# Patient Record
Sex: FEMALE | Race: WHITE
[De-identification: names, ages, dates, MRNs, and addresses within clinical notes are randomized per-mention and may not be internally consistent; named-entity substitution may affect disease eponyms.]

---

## 2017-10-07 ENCOUNTER — HOSPITAL ENCOUNTER (EMERGENCY)
Dept: HOSPITAL 53 - M ED | Age: 32
Discharge: HOME | End: 2017-10-07
Payer: SELF-PAY

## 2017-10-07 VITALS — SYSTOLIC BLOOD PRESSURE: 156 MMHG | DIASTOLIC BLOOD PRESSURE: 96 MMHG

## 2017-10-07 VITALS — HEIGHT: 67 IN | WEIGHT: 280.43 LBS | BODY MASS INDEX: 44.01 KG/M2

## 2017-10-07 DIAGNOSIS — R41.82: ICD-10-CM

## 2017-10-07 DIAGNOSIS — F14.10: Primary | ICD-10-CM

## 2017-10-07 DIAGNOSIS — I10: ICD-10-CM

## 2017-10-07 DIAGNOSIS — F17.210: ICD-10-CM

## 2017-10-07 LAB
ADD MANUAL DIFFER: NO
ALBUMIN SERPL BCG-MCNC: 3.7 GM/DL (ref 3.2–5.2)
ALBUMIN/GLOB SERPL: 1.12 {RATIO} (ref 1–1.93)
ALP SERPL-CCNC: 90 U/L (ref 45–117)
ALT SERPL W P-5'-P-CCNC: 28 U/L (ref 12–78)
ANION GAP SERPL CALC-SCNC: 10 MEQ/L (ref 8–16)
AST SERPL-CCNC: 16 U/L (ref 15–37)
BASOPHILS # BLD AUTO: 0.1 10^3/UL (ref 0–0.2)
BASOPHILS NFR BLD AUTO: 0.7 % (ref 0–1)
BILIRUB CONJ SERPL-MCNC: 0.2 MG/DL (ref 0–0.2)
BILIRUB SERPL-MCNC: 0.8 MG/DL (ref 0.2–1)
BUN SERPL-MCNC: 11 MG/DL (ref 7–18)
CALCIUM SERPL-MCNC: 8.1 MG/DL (ref 8.5–10.1)
CHLORIDE SERPL-SCNC: 105 MEQ/L (ref 98–107)
CO2 SERPL-SCNC: 24 MEQ/L (ref 21–32)
CREAT SERPL-MCNC: 0.92 MG/DL (ref 0.55–1.02)
DIFF SLIDE NUMBER: 117
EOSINOPHIL # BLD AUTO: 0.4 10^3/UL (ref 0–0.5)
EOSINOPHIL NFR BLD AUTO: 5 % (ref 0–3)
ERYTHROCYTE [DISTWIDTH] IN BLOOD BY AUTOMATED COUNT: 12.6 % (ref 11.5–14.5)
GFR SERPL CREATININE-BSD FRML MDRD: > 60 ML/MIN/{1.73_M2} (ref 60–?)
GLUCOSE SERPL-MCNC: 99 MG/DL (ref 70–105)
IMM GRANULOCYTES NFR BLD: 0.4 % (ref 0–0)
INR PPP: 1.05
LYMPHOCYTES # BLD AUTO: 1.6 10^3/UL (ref 1.5–4.5)
LYMPHOCYTES NFR BLD AUTO: 18.3 % (ref 24–44)
MCH RBC QN AUTO: 31.3 PG (ref 27–33)
MCHC RBC AUTO-ENTMCNC: 33.3 G/DL (ref 32–36.5)
MCV RBC AUTO: 94 FL (ref 80–96)
METHADONE UR QL SCN: NEGATIVE
MONOCYTES # BLD AUTO: 0.6 10^3/UL (ref 0–0.8)
MONOCYTES NFR BLD AUTO: 7.3 % (ref 0–5)
NEUTROPHILS # BLD AUTO: 5.8 10^3/UL (ref 1.8–7.7)
NEUTROPHILS NFR BLD AUTO: 68.3 % (ref 36–66)
NRBC BLD AUTO-RTO: 0 % (ref 0–0)
PLATELET # BLD AUTO: 316 10^3/UL (ref 150–450)
POTASSIUM SERPL-SCNC: 3.6 MEQ/L (ref 3.5–5.1)
PROT SERPL-MCNC: 7 GM/DL (ref 6.4–8.2)
SODIUM SERPL-SCNC: 139 MEQ/L (ref 136–145)
WBC # BLD AUTO: 8.5 10^3/UL (ref 4–10)

## 2017-10-07 NOTE — REP
REASON:  CVA.

 

COMPARISON:  06/19/2016

 

FINDINGS:  The technique utilized in obtaining the radiograph has magnified the

cardiac silhouette and accentuated the interstitial markings.

 

The superior mediastinal structures are midline.  The cardiac silhouette is

unremarkable in size, shape, and position. The diaphragmatic surfaces of the

lungs are regular, and the costophrenic angles are clear.  The pulmonary fields

are clear.  The imaged osseous structures are intact.

 

IMPRESSION:

 

There is no acute cardiopulmonary disease.

 

 

Signed by

Yan Arboleda DO 10/07/2017 10:22 A

## 2017-10-07 NOTE — REP
REASON:  CVA.

 

COMPARISON:  01/01/2016

 

TECHNIQUE:  4.5 mm contiguous transaxial sections were obtained from the skull

base to the cerebral convexities with thin cuts through the posterior fossa

without the administration of intravenous contrast.

 

FINDINGS:  The ventricles and sulci are consistent with the patient's age.  There

are no extra-axial fluid collections.  There is no mass effect.  The deep

cerebral white matter is consistent with the patient's age.

 

The orbital and petrous structures , cerebellopontine angles, and posterior fossa

are unremarkable.

 

The sella turcica, cavernous, and paracavernous structures are essentially

unremarkable.

 

The visualized portions of the paranasal sinuses and mastoid air cells are

clear.

 

Images of the skull base show no gross abnormality.

 

IMPRESSION:

 

Essentially unremarkable CT examination of the brain.

 

There has been no significant change compared to the prior exam.

 

 

Signed by

Yan Arboleda DO 10/07/2017 10:22 A

## 2017-10-08 ENCOUNTER — HOSPITAL ENCOUNTER (EMERGENCY)
Dept: HOSPITAL 53 - M ED | Age: 32
Discharge: LEFT BEFORE DISCHARGE | End: 2017-10-08
Payer: SELF-PAY

## 2017-10-08 VITALS — HEIGHT: 67 IN | BODY MASS INDEX: 44.04 KG/M2 | WEIGHT: 280.58 LBS

## 2017-10-08 VITALS — DIASTOLIC BLOOD PRESSURE: 102 MMHG | SYSTOLIC BLOOD PRESSURE: 173 MMHG

## 2017-10-08 DIAGNOSIS — Z53.21: ICD-10-CM

## 2017-10-08 DIAGNOSIS — M79.603: Primary | ICD-10-CM

## 2017-10-08 NOTE — ECGEPIP
Stationary ECG Study

                           [facility] - ED

                                       

                                       Test Date:    2017-10-07

Pat Name:     SUSANA ALLEN           Department:   

Patient ID:   [id number]                 Room:         -

Gender:       F                        Technician:   mb

:          1985               Requested By: ARI OSCAR

Order Number: [id number]     Reading MD:   Ana Catalan

                                 Measurements

Intervals                              Axis          

Rate:         87                       P:            55

TN:           162                      QRS:          71

QRSD:         81                       T:            64

QT:           384                                    

QTc:          463                                    

                           Interpretive Statements

SINUS RHYTHM

SIMILAR 16

Electronically Signed On 10-8-2017 12:34:48 EDT by Ana Catalan

## 2017-11-26 ENCOUNTER — HOSPITAL ENCOUNTER (EMERGENCY)
Dept: HOSPITAL 53 - M ED | Age: 32
Discharge: HOME | End: 2017-11-26
Payer: SELF-PAY

## 2017-11-26 VITALS — HEIGHT: 67 IN | BODY MASS INDEX: 44.04 KG/M2 | WEIGHT: 280.58 LBS

## 2017-11-26 VITALS — SYSTOLIC BLOOD PRESSURE: 143 MMHG | DIASTOLIC BLOOD PRESSURE: 90 MMHG

## 2017-11-26 DIAGNOSIS — Z86.69: ICD-10-CM

## 2017-11-26 DIAGNOSIS — I10: ICD-10-CM

## 2017-11-26 DIAGNOSIS — F17.210: ICD-10-CM

## 2017-11-26 DIAGNOSIS — Z79.82: ICD-10-CM

## 2017-11-26 DIAGNOSIS — J02.9: Primary | ICD-10-CM

## 2017-11-26 LAB — CONTROL LINE MONO: (no result)

## 2018-10-11 ENCOUNTER — HOSPITAL ENCOUNTER (OUTPATIENT)
Dept: HOSPITAL 53 - M SDC | Age: 33
LOS: 1 days | Discharge: HOME | End: 2018-10-12
Attending: SURGERY
Payer: SELF-PAY

## 2018-10-11 DIAGNOSIS — Z79.899: ICD-10-CM

## 2018-10-11 DIAGNOSIS — F17.210: ICD-10-CM

## 2018-10-11 DIAGNOSIS — K35.890: Primary | ICD-10-CM

## 2018-10-11 DIAGNOSIS — I10: ICD-10-CM

## 2018-10-11 LAB
ALBUMIN/GLOBULIN RATIO: 0.97 (ref 1–1.93)
ALBUMIN: 3.4 GM/DL (ref 3.2–5.2)
ALKALINE PHOSPHATASE: 96 U/L (ref 45–117)
ALT SERPL W P-5'-P-CCNC: 33 U/L (ref 12–78)
ANION GAP: 7 MEQ/L (ref 8–16)
AST SERPL-CCNC: 19 U/L (ref 7–37)
BASO #: 0.1 10^3/UL (ref 0–0.2)
BASO %: 0.9 % (ref 0–1)
BILIRUB CONJ SERPL-MCNC: < 0.1 MG/DL (ref 0–0.2)
BILIRUBIN,TOTAL: 0.2 MG/DL (ref 0.2–1)
BLOOD UREA NITROGEN: 13 MG/DL (ref 7–18)
CALCIUM LEVEL: 8.5 MG/DL (ref 8.5–10.1)
CARBON DIOXIDE LEVEL: 24 MEQ/L (ref 21–32)
CHLORIDE LEVEL: 110 MEQ/L (ref 98–107)
CONTROL LINE UCG: (no result)
CREATININE FOR GFR: 0.95 MG/DL (ref 0.55–1.3)
EOS #: 0.6 10^3/UL (ref 0–0.5)
EOSINOPHIL NFR BLD AUTO: 6.5 % (ref 0–3)
GFR SERPL CREATININE-BSD FRML MDRD: > 60 ML/MIN/{1.73_M2} (ref 60–?)
GLUCOSE, FASTING: 60 MG/DL (ref 70–100)
HEMATOCRIT: 42.4 % (ref 36–47)
HEMOGLOBIN: 14 G/DL (ref 12–15.5)
IMMATURE GRANULOCYTE %: 0.3 % (ref 0–3)
LEUKOCYTE ESTERASE UR AUTO RFX: (no result)
LIPASE: 116 U/L (ref 73–393)
LYMPH #: 1.9 10^3/UL (ref 1.5–4.5)
LYMPH %: 22 % (ref 24–44)
MEAN CORPUSCULAR HEMOGLOBIN: 31.3 PG (ref 27–33)
MEAN CORPUSCULAR HGB CONC: 33 G/DL (ref 32–36.5)
MEAN CORPUSCULAR VOLUME: 94.9 FL (ref 80–96)
MONO #: 0.7 10^3/UL (ref 0–0.8)
MONO %: 7.7 % (ref 0–5)
NEUTROPHILS #: 5.4 10^3/UL (ref 1.8–7.7)
NEUTROPHILS %: 62.6 % (ref 36–66)
NRBC BLD AUTO-RTO: 0 % (ref 0–0)
PLATELET COUNT, AUTOMATED: 330 10^3/UL (ref 150–450)
POTASSIUM SERUM: 4.7 MEQ/L (ref 3.5–5.1)
RED BLOOD COUNT: 4.47 10^6/UL (ref 4–5.4)
RED CELL DISTRIBUTION WIDTH: 12.9 % (ref 11.5–14.5)
SODIUM LEVEL: 141 MEQ/L (ref 136–145)
SPECIFIC GRAVITY UR AUTO RFX: 1.02 (ref 1–1.03)
SQUAM EPITHELIAL CELL UR AURFX: 4 /HPF (ref 0–6)
TOTAL PROTEIN: 6.9 GM/DL (ref 6.4–8.2)
URINE PREG TEST: NEGATIVE
WHITE BLOOD COUNT: 8.6 10^3/UL (ref 4–10)

## 2018-10-11 PROCEDURE — 44970 LAPAROSCOPY APPENDECTOMY: CPT

## 2018-10-11 RX ADMIN — MORPHINE SULFATE 1 MG: 4 INJECTION INTRAVENOUS at 10:51

## 2018-10-11 RX ADMIN — SODIUM CHLORIDE, POTASSIUM CHLORIDE, SODIUM LACTATE AND CALCIUM CHLORIDE 1 MLS/HR: 600; 310; 30; 20 INJECTION, SOLUTION INTRAVENOUS at 16:30

## 2018-10-11 RX ADMIN — DEXTROSE MONOHYDRATE 1 MLS/HR: 5 INJECTION INTRAVENOUS at 22:41

## 2018-10-11 RX ADMIN — SODIUM CHLORIDE 1 MLS/HR: 9 INJECTION, SOLUTION INTRAVENOUS at 17:16

## 2018-10-11 RX ADMIN — SODIUM CHLORIDE 1 MLS/HR: 9 INJECTION, SOLUTION INTRAVENOUS at 09:27

## 2018-10-11 RX ADMIN — MORPHINE SULFATE 1 MG: 4 INJECTION INTRAVENOUS at 09:28

## 2018-10-11 RX ADMIN — DEXTROSE MONOHYDRATE 1 MLS/HR: 5 INJECTION INTRAVENOUS at 17:17

## 2018-10-11 RX ADMIN — HYDROCODONE BITARTRATE AND ACETAMINOPHEN 1 TAB: 5; 325 TABLET ORAL at 23:22

## 2018-10-11 RX ADMIN — HYDROCODONE BITARTRATE AND ACETAMINOPHEN 1 TAB: 5; 325 TABLET ORAL at 18:51

## 2018-10-11 RX ADMIN — ONDANSETRON 1 MG: 2 INJECTION INTRAMUSCULAR; INTRAVENOUS at 09:27

## 2018-10-11 RX ADMIN — DEXTROSE MONOHYDRATE 1 MLS/HR: 5 INJECTION INTRAVENOUS at 11:15

## 2018-10-11 RX ADMIN — KETOROLAC TROMETHAMINE 1 MG: 30 INJECTION, SOLUTION INTRAMUSCULAR at 17:29

## 2018-10-12 RX ADMIN — SODIUM CHLORIDE 1 MLS/HR: 9 INJECTION, SOLUTION INTRAVENOUS at 03:24

## 2018-10-12 RX ADMIN — DEXTROSE MONOHYDRATE 1 MLS/HR: 5 INJECTION INTRAVENOUS at 04:58

## 2018-10-12 RX ADMIN — HYDROCODONE BITARTRATE AND ACETAMINOPHEN 1 TAB: 5; 325 TABLET ORAL at 05:00

## 2018-10-12 RX ADMIN — SODIUM CHLORIDE 1 MLS/HR: 9 INJECTION, SOLUTION INTRAVENOUS at 08:01

## 2019-02-27 ENCOUNTER — HOSPITAL ENCOUNTER (OUTPATIENT)
Dept: HOSPITAL 53 - M LAB REF | Age: 34
End: 2019-02-27
Attending: FAMILY MEDICINE
Payer: COMMERCIAL

## 2019-02-27 DIAGNOSIS — I10: ICD-10-CM

## 2019-02-27 DIAGNOSIS — Z13.228: Primary | ICD-10-CM

## 2019-02-27 DIAGNOSIS — E66.8: ICD-10-CM

## 2019-02-27 LAB
25(OH)D3 SERPL-MCNC: 15.2 NG/ML (ref 30–100)
ALBUMIN SERPL BCG-MCNC: 4 GM/DL (ref 3.2–5.2)
ALT SERPL W P-5'-P-CCNC: 34 U/L (ref 12–78)
AMORPH SED URNS QL MICRO: (no result)
APPEARANCE UR: (no result)
BACTERIA UR QL AUTO: NEGATIVE
BASOPHILS # BLD AUTO: 0.1 10^3/UL (ref 0–0.2)
BASOPHILS NFR BLD AUTO: 0.7 % (ref 0–1)
BILIRUB SERPL-MCNC: 0.7 MG/DL (ref 0.2–1)
BILIRUB UR QL STRIP.AUTO: NEGATIVE
BUN SERPL-MCNC: 15 MG/DL (ref 7–18)
CALCIUM SERPL-MCNC: 8.3 MG/DL (ref 8.5–10.1)
CHLORIDE SERPL-SCNC: 106 MEQ/L (ref 98–107)
CHOLEST SERPL-MCNC: 165 MG/DL (ref ?–200)
CHOLEST/HDLC SERPL: 4.46 {RATIO} (ref ?–5)
CO2 SERPL-SCNC: 24 MEQ/L (ref 21–32)
CREAT SERPL-MCNC: 1.04 MG/DL (ref 0.55–1.3)
EOSINOPHIL # BLD AUTO: 0.5 10^3/UL (ref 0–0.5)
EOSINOPHIL NFR BLD AUTO: 5.5 % (ref 0–3)
EST. AVERAGE GLUCOSE BLD GHB EST-MCNC: 120 MG/DL (ref 60–110)
GFR SERPL CREATININE-BSD FRML MDRD: > 60 ML/MIN/{1.73_M2} (ref 60–?)
GLUCOSE SERPL-MCNC: 94 MG/DL (ref 70–100)
GLUCOSE UR QL STRIP.AUTO: NEGATIVE MG/DL
HCT VFR BLD AUTO: 45.7 % (ref 36–47)
HDLC SERPL-MCNC: 37 MG/DL (ref 40–?)
HGB BLD-MCNC: 14.8 G/DL (ref 12–15.5)
HGB UR QL STRIP.AUTO: (no result)
KETONES UR QL STRIP.AUTO: NEGATIVE MG/DL
LDLC SERPL CALC-MCNC: 75 MG/DL (ref ?–100)
LEUKOCYTE ESTERASE UR QL STRIP.AUTO: NEGATIVE
LYMPHOCYTES # BLD AUTO: 2.2 10^3/UL (ref 1.5–4.5)
LYMPHOCYTES NFR BLD AUTO: 26.3 % (ref 24–44)
MCH RBC QN AUTO: 30.4 PG (ref 27–33)
MCHC RBC AUTO-ENTMCNC: 32.4 G/DL (ref 32–36.5)
MCV RBC AUTO: 93.8 FL (ref 80–96)
MONOCYTES # BLD AUTO: 0.4 10^3/UL (ref 0–0.8)
MONOCYTES NFR BLD AUTO: 4.8 % (ref 0–5)
MUCOUS THREADS URNS QL MICRO: (no result)
NEUTROPHILS # BLD AUTO: 5.2 10^3/UL (ref 1.8–7.7)
NEUTROPHILS NFR BLD AUTO: 62.5 % (ref 36–66)
NITRITE UR QL STRIP.AUTO: NEGATIVE
NONHDLC SERPL-MCNC: 128 MG/DL
PH UR STRIP.AUTO: 5 UNITS (ref 5–9)
PLATELET # BLD AUTO: 351 10^3/UL (ref 150–450)
POTASSIUM SERPL-SCNC: 4.1 MEQ/L (ref 3.5–5.1)
PROT SERPL-MCNC: 7.8 GM/DL (ref 6.4–8.2)
PROT UR QL STRIP.AUTO: (no result) MG/DL
RBC # BLD AUTO: 4.87 10^6/UL (ref 4–5.4)
RBC # UR AUTO: (no result) /HPF (ref 0–3)
SODIUM SERPL-SCNC: 138 MEQ/L (ref 136–145)
SP GR UR STRIP.AUTO: 1.03 (ref 1–1.03)
SQUAMOUS #/AREA URNS AUTO: 4 /HPF (ref 0–6)
T4 FREE SERPL-MCNC: 1.07 NG/DL (ref 0.76–1.46)
TRIGL SERPL-MCNC: 263 MG/DL (ref ?–150)
TSH SERPL DL<=0.005 MIU/L-ACNC: 1.52 UIU/ML (ref 0.36–3.74)
UROBILINOGEN UR QL STRIP.AUTO: 0.2 MG/DL (ref 0–2)
WBC # BLD AUTO: 8.3 10^3/UL (ref 4–10)
WBC #/AREA URNS AUTO: 0 /HPF (ref 0–3)

## 2019-03-01 LAB
B BURGDOR IGG+IGM SER-ACNC: <0.91 ISR (ref 0–0.9)
B BURGDOR IGM SER IA-ACNC: <0.8 INDEX (ref 0–0.79)

## 2020-07-27 ENCOUNTER — HOSPITAL ENCOUNTER (OUTPATIENT)
Dept: HOSPITAL 53 - M PLALAB | Age: 35
End: 2020-07-27
Attending: STUDENT IN AN ORGANIZED HEALTH CARE EDUCATION/TRAINING PROGRAM
Payer: COMMERCIAL

## 2020-07-27 DIAGNOSIS — R06.00: ICD-10-CM

## 2020-07-27 DIAGNOSIS — Z13.1: Primary | ICD-10-CM

## 2020-07-27 DIAGNOSIS — R10.12: ICD-10-CM

## 2020-08-20 LAB
HCT VFR BLD AUTO: 46 % (ref 36–47)
HGB BLD-MCNC: 14.6 G/DL (ref 12–15.5)
MCH RBC QN AUTO: 31.2 PG (ref 27–33)
MCHC RBC AUTO-ENTMCNC: 31.7 G/DL (ref 32–36.5)
MCV RBC AUTO: 98.3 FL (ref 80–96)
PLATELET # BLD AUTO: 326 10^3/UL (ref 150–450)
RBC # BLD AUTO: 4.68 10^6/UL (ref 4–5.4)
WBC # BLD AUTO: 8.4 10^3/UL (ref 4–10)